# Patient Record
Sex: FEMALE | Race: WHITE
[De-identification: names, ages, dates, MRNs, and addresses within clinical notes are randomized per-mention and may not be internally consistent; named-entity substitution may affect disease eponyms.]

---

## 2019-03-11 ENCOUNTER — HOSPITAL ENCOUNTER (EMERGENCY)
Dept: HOSPITAL 7 - FB.ED | Age: 24
Discharge: HOME | End: 2019-03-11
Payer: COMMERCIAL

## 2019-03-11 DIAGNOSIS — J02.0: Primary | ICD-10-CM

## 2019-03-11 NOTE — EDM.PDOC
ED HPI GENERAL MEDICAL PROBLEM





- General


Chief Complaint: ENT Problem


Stated Complaint: WEAK FEVER


Time Seen by Provider: 03/11/19 10:35


Source of Information: Reports: Patient


History Limitations: Reports: No Limitations





- History of Present Illness


INITIAL COMMENTS - FREE TEXT/NARRATIVE: 





Chief complaint onset yesterday, 3/10/19, of 5-6/10 pharyngeal pain, otalgia, 

mild headache, not resolved with DayQuil or NyQuil last night. Presently 

otalgia on right side is 1-3/10. She has not had ear surgery ie. PE tube 

placement. Has mild associated neck soreness.  has slight cough. She has 

allergies to amoxicillin penicillin sulfa. She denies decreased hearing, neck 

stiffness, rest pain, having traveled long distances, abnormal heartbeat, 

nausea or vomiting or diarrhea  frequency urgency or dysuria, or myalgia or 

joint pain





- Related Data


 Allergies











Allergy/AdvReac Type Severity Reaction Status Date / Time


 


Penicillins Allergy  Rash Verified 03/11/19 10:39











Home Meds: 


 Home Meds





Azithromycin [Zithromax] 250 mg PO ASDIRECTED #6 tab 03/11/19 [Rx]











ED ROS ENT





- Review of Systems


Review Of Systems: ROS reveals no pertinent complaints other than HPI.





ED EXAM, ENT





- Physical Exam


Exam: See Below


Text/Narrative:: 





Doesn't moderately sick woman who is quite lithe and tall.


Exam Limited By: No Limitations


General Appearance: Moderate Distress


Eye Exam: Bilateral Eye: Normal Inspection


Ears: Normal External Exam, Normal Canal, Hearing Grossly Normal, Other (Right 

greater than left retraction TM with moderate capillary injection over right 

umbow.)


Nose: Normal Inspection


Mouth/Throat: Normal Gums, Normal Lips, Normal Teeth, Pharyngeal Erythema


Head: Atraumatic, Normocephalic, Other (No sinus tenderness)


Neck: Normal Inspection, Other (Mild cervical adenopathy neck is supple)


Respiratory/Chest: No Respiratory Distress, Lungs Clear, Normal Breath Sounds, 

No Accessory Muscle Use, Chest Non-Tender


Cardiovascular: Normal Peripheral Pulses, Regular Rate, Rhythm, No Edema, No 

Gallop, No Murmur, No Rub


GI/Abdominal: Normal Bowel Sounds, Soft, Non-Tender, No Organomegaly, No 

Distention, No Abnormal Bruit, No Mass


 (Female) Exam: Deferred


Rectal (Female) Exam: Deferred


Back: Normal Inspection, Full Range of Motion


Extremities: Normal Inspection, Normal Range of Motion, Non-Tender, No Pedal 

Edema, Normal Capillary Refill


Neurological: Alert, Oriented, CN II-XII Intact, Normal Cognition, Normal Gait, 

Normal Reflexes, No Motor/Sensory Deficits


Psychiatric: Normal Affect, Normal Mood


Skin: Warm, Dry, Intact


Lymphatic: Adenopathy





Course





- Vital Signs


Last Recorded V/S: 





 Last Vital Signs











Temp  37.5 C   03/11/19 10:35


 


Pulse  107 H  03/11/19 10:35


 


Resp  16   03/11/19 10:35


 


BP  121/76   03/11/19 10:35


 


Pulse Ox  98   03/11/19 10:35














Departure





- Departure


Time of Disposition: 11:00


Disposition: Home, Self-Care 01


Condition: Fair


Clinical Impression: 


 Acute streptococcal pharyngitis








- Discharge Information


*PRESCRIPTION DRUG MONITORING PROGRAM REVIEWED*: Not Applicable


*COPY OF PRESCRIPTION DRUG MONITORING REPORT IN PATIENT ISABELLA: Not Applicable


Prescriptions: 


Azithromycin [Zithromax] 250 mg PO ASDIRECTED #6 tab


Instructions:  Strep Throat


Referrals: 


PCP,None [Primary Care Provider] - 


Forms:  ED Department Discharge


Additional Instructions: 


You have a strep throat. 





Since you're allergic to penicillin, I have prescribed a Z-Ford.





Follow-up with her doctor as needed in a week 





1000 mg Tylenol and 600 mg ibuprofen together every 6 hours for pain relief.





Drink at least 2 quarts of liquid per day.